# Patient Record
Sex: MALE | Race: WHITE | NOT HISPANIC OR LATINO
[De-identification: names, ages, dates, MRNs, and addresses within clinical notes are randomized per-mention and may not be internally consistent; named-entity substitution may affect disease eponyms.]

---

## 2019-12-05 ENCOUNTER — RECORD ABSTRACTING (OUTPATIENT)
Age: 69
End: 2019-12-05

## 2019-12-05 DIAGNOSIS — Z00.00 ENCOUNTER FOR GENERAL ADULT MEDICAL EXAMINATION W/OUT ABNORMAL FINDINGS: ICD-10-CM

## 2019-12-05 DIAGNOSIS — I10 ESSENTIAL (PRIMARY) HYPERTENSION: ICD-10-CM

## 2019-12-05 DIAGNOSIS — E78.5 HYPERLIPIDEMIA, UNSPECIFIED: ICD-10-CM

## 2019-12-05 DIAGNOSIS — Z78.9 OTHER SPECIFIED HEALTH STATUS: ICD-10-CM

## 2019-12-05 DIAGNOSIS — Z80.1 FAMILY HISTORY OF MALIGNANT NEOPLASM OF TRACHEA, BRONCHUS AND LUNG: ICD-10-CM

## 2019-12-05 LAB — PSA SERPL-MCNC: 6.08

## 2019-12-05 RX ORDER — CANAGLIFLOZIN 300 MG/1
TABLET, FILM COATED ORAL
Refills: 0 | Status: ACTIVE | COMMUNITY

## 2019-12-05 RX ORDER — RAMIPRIL 5 MG/1
CAPSULE ORAL
Refills: 0 | Status: ACTIVE | COMMUNITY

## 2019-12-05 RX ORDER — GLIMEPIRIDE 4 MG/1
TABLET ORAL
Refills: 0 | Status: ACTIVE | COMMUNITY

## 2019-12-05 RX ORDER — METFORMIN HYDROCHLORIDE 625 MG/1
TABLET ORAL
Refills: 0 | Status: ACTIVE | COMMUNITY

## 2019-12-05 RX ORDER — ROSUVASTATIN CALCIUM 5 MG/1
TABLET, FILM COATED ORAL
Refills: 0 | Status: ACTIVE | COMMUNITY

## 2019-12-05 RX ORDER — ASPIRIN 325 MG/1
TABLET, FILM COATED ORAL
Refills: 0 | Status: ACTIVE | COMMUNITY

## 2019-12-05 RX ORDER — FENOFIBRATE 150 MG/1
CAPSULE ORAL
Refills: 0 | Status: ACTIVE | COMMUNITY

## 2020-01-31 ENCOUNTER — APPOINTMENT (OUTPATIENT)
Dept: UROLOGY | Facility: CLINIC | Age: 70
End: 2020-01-31
Payer: MEDICARE

## 2020-01-31 VITALS
TEMPERATURE: 98.6 F | DIASTOLIC BLOOD PRESSURE: 82 MMHG | BODY MASS INDEX: 27.77 KG/M2 | HEIGHT: 72 IN | HEART RATE: 68 BPM | WEIGHT: 205 LBS | SYSTOLIC BLOOD PRESSURE: 116 MMHG

## 2020-01-31 DIAGNOSIS — E11.9 TYPE 2 DIABETES MELLITUS W/OUT COMPLICATIONS: ICD-10-CM

## 2020-01-31 DIAGNOSIS — Z78.9 OTHER SPECIFIED HEALTH STATUS: ICD-10-CM

## 2020-01-31 LAB
BILIRUB UR QL STRIP: NORMAL
CLARITY UR: CLEAR
COLLECTION METHOD: NORMAL
GLUCOSE UR-MCNC: 1000
HCG UR QL: 0.2 EU/DL
HGB UR QL STRIP.AUTO: NORMAL
KETONES UR-MCNC: NORMAL
LEUKOCYTE ESTERASE UR QL STRIP: NORMAL
NITRITE UR QL STRIP: NORMAL
PH UR STRIP: 5
PROT UR STRIP-MCNC: NORMAL
SP GR UR STRIP: 1.02

## 2020-01-31 PROCEDURE — 81003 URINALYSIS AUTO W/O SCOPE: CPT | Mod: QW

## 2020-01-31 PROCEDURE — 52281 CYSTOSCOPY AND TREATMENT: CPT

## 2020-01-31 PROCEDURE — 99215 OFFICE O/P EST HI 40 MIN: CPT | Mod: 25

## 2020-01-31 PROCEDURE — 76857 US EXAM PELVIC LIMITED: CPT

## 2020-01-31 NOTE — ASSESSMENT
[FreeTextEntry1] : I discussed the findings and options with Mr. NINA GILES in detail. I am concerned about the increasing urinary retention.  As the next step we will add Proscar to the Flomax bid.  \par I would like to see Mr. Giles in 6mths for a repeat bladder sono.  \par His next cystoscopic evaluation will be in one year. \par

## 2020-01-31 NOTE — PHYSICAL EXAM
[General Appearance - Well Nourished] : well nourished [General Appearance - Well Developed] : well developed [General Appearance - In No Acute Distress] : no acute distress [Normal Appearance] : normal appearance [Well Groomed] : well groomed [Abdomen Tenderness] : non-tender [Abdomen Soft] : soft [Abdomen Hernia] : no hernia was discovered [Costovertebral Angle Tenderness] : no ~M costovertebral angle tenderness [Abdomen Mass (___ Cm)] : no abdominal mass palpated [Urethral Meatus] : meatus normal [Scrotum] : the scrotum was normal [Urinary Bladder Findings] : the bladder was normal on palpation [Epididymis] : the epididymides were normal [Testes Tenderness] : no tenderness of the testes [Prostate Tenderness] : the prostate was not tender [Testes Mass (___cm)] : there were no testicular masses [No Prostate Nodules] : no prostate nodules [Skin Color & Pigmentation] : normal skin color and pigmentation [Heart Rate And Rhythm] : Heart rate and rhythm were normal [Edema] : no peripheral edema [Respiration, Rhythm And Depth] : normal respiratory rhythm and effort [] : no respiratory distress [Oriented To Time, Place, And Person] : oriented to person, place, and time [Exaggerated Use Of Accessory Muscles For Inspiration] : no accessory muscle use [Mood] : the mood was normal [Affect] : the affect was normal [Not Anxious] : not anxious [Normal Station and Gait] : the gait and station were normal for the patient's age [No Palpable Adenopathy] : no palpable adenopathy [No Focal Deficits] : no focal deficits [FreeTextEntry1] : Mild balanitis suggestive of LSA.

## 2020-01-31 NOTE — LETTER BODY
[Please see my note below.] : Please see my note below. [Dear  ___] : Dear  [unfilled], [Consult Closing:] : Thank you very much for allowing me to participate in the care of this patient.  If you have any questions, please do not hesitate to contact me. [FreeTextEntry3] : Augustine Gates MD, FACS\par

## 2020-01-31 NOTE — HISTORY OF PRESENT ILLNESS
[FreeTextEntry1] : Mr. NINA GILES comes in today for his urologic follow-up.  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 He is here for his semi-annual cystoscopy.  He denies hematuria or any systemic complaints.\par \par From his general urologic history, he reports moderate LUTS (obstructive and irritative) with nocturia x2-4. On occasion he experiences urge incontinence. He is continuing on tamsulosin bid.\par IPSS:  15/35\par Sono:  397cc PVR; 127cc prostate\par \par Mr. Giles has a several year history of ED but is not sexually active and does not want to pursue this.\par \par Path:  TURBT (12/20/16)--TaG3\par \par PSAs: 12/2/19--5.72; 9/15/18--4.23; 5/5/18--5.22; 6/8/17--6.09; 10/20/16--4.58\par \par Renal sono: 12/14/18--Lt complex septated cyst; 1/6/20--Unchanged bilateral renal cysts.\par

## 2020-02-03 LAB — PSA SERPL-MCNC: 5.27 NG/ML

## 2020-02-04 LAB — URINE CYTOLOGY: NORMAL

## 2020-10-23 ENCOUNTER — APPOINTMENT (OUTPATIENT)
Dept: UROLOGY | Facility: CLINIC | Age: 70
End: 2020-10-23

## 2021-03-12 ENCOUNTER — RX RENEWAL (OUTPATIENT)
Age: 71
End: 2021-03-12

## 2021-04-12 ENCOUNTER — APPOINTMENT (OUTPATIENT)
Dept: UROLOGY | Facility: CLINIC | Age: 71
End: 2021-04-12
Payer: MEDICARE

## 2021-04-12 VITALS
OXYGEN SATURATION: 95 % | SYSTOLIC BLOOD PRESSURE: 123 MMHG | HEART RATE: 71 BPM | TEMPERATURE: 98.2 F | DIASTOLIC BLOOD PRESSURE: 66 MMHG | BODY MASS INDEX: 29.12 KG/M2 | WEIGHT: 215 LBS | HEIGHT: 72 IN

## 2021-04-12 LAB
BILIRUB UR QL STRIP: NORMAL
CLARITY UR: CLEAR
COLLECTION METHOD: NORMAL
GLUCOSE UR-MCNC: NORMAL
HCG UR QL: 0.2 EU/DL
HGB UR QL STRIP.AUTO: NORMAL
KETONES UR-MCNC: NORMAL
LEUKOCYTE ESTERASE UR QL STRIP: NORMAL
NITRITE UR QL STRIP: NORMAL
PH UR STRIP: 5
PROT UR STRIP-MCNC: NORMAL
SP GR UR STRIP: 1.01

## 2021-04-12 PROCEDURE — 76857 US EXAM PELVIC LIMITED: CPT | Mod: 59

## 2021-04-12 PROCEDURE — 76775 US EXAM ABDO BACK WALL LIM: CPT

## 2021-04-12 PROCEDURE — 52000 CYSTOURETHROSCOPY: CPT

## 2021-04-12 PROCEDURE — 99215 OFFICE O/P EST HI 40 MIN: CPT | Mod: 25

## 2021-04-12 PROCEDURE — 99072 ADDL SUPL MATRL&STAF TM PHE: CPT

## 2021-04-12 RX ORDER — BLOOD SUGAR DIAGNOSTIC
STRIP MISCELLANEOUS
Qty: 100 | Refills: 0 | Status: ACTIVE | COMMUNITY
Start: 2020-11-02

## 2021-04-12 RX ORDER — ROSUVASTATIN CALCIUM 10 MG/1
10 TABLET, FILM COATED ORAL
Qty: 90 | Refills: 0 | Status: ACTIVE | COMMUNITY
Start: 2021-03-11

## 2021-04-12 RX ORDER — MUPIROCIN 20 MG/G
2 OINTMENT TOPICAL
Qty: 22 | Refills: 0 | Status: ACTIVE | COMMUNITY
Start: 2021-02-16

## 2021-04-12 RX ORDER — BETAMETHASONE DIPROPIONATE 0.5 MG/G
0.05 CREAM TOPICAL
Qty: 45 | Refills: 0 | Status: ACTIVE | COMMUNITY
Start: 2021-02-16

## 2021-04-12 RX ORDER — TAMSULOSIN HYDROCHLORIDE 0.4 MG/1
CAPSULE ORAL
Refills: 0 | Status: DISCONTINUED | COMMUNITY
End: 2021-04-12

## 2021-04-12 NOTE — HISTORY OF PRESENT ILLNESS
[FreeTextEntry1] : Mr. NINA GILES comes in today for his urologic follow-up, including an annual cystoscopy.  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   He denies hematuria or any systemic complaints.\par \par From his general urologic history, he reports moderate LUTS (obstructive and irritative) with nocturia x2-4. On occasion he experiences urge incontinence. He is continuing on tamsulosin bid and finasteride 5mg.\par IPSS:  13/35\par Sono:  45cc PVR; Prostate 115cc\par \par Mr. Giles has a several year history of ED but is not sexually active and does not want to pursue this.\par \par Path:  TURBT (12/20/16)--TaG3\par \par PSAs: 1/31/20--5.27; 12/2/19--5.72; 9/15/18--4.23; 5/5/18--5.22; 6/8/17--6.09; 10/20/16--4.58\par \par Renal sono: 4/12/21--Bilateral complex renal cysts with thin septations; 12/14/18--Lt complex septated cyst; 1/6/20--Unchanged bilateral renal cysts.\par

## 2021-04-12 NOTE — PHYSICAL EXAM
[General Appearance - Well Developed] : well developed [General Appearance - Well Nourished] : well nourished [Normal Appearance] : normal appearance [Well Groomed] : well groomed [General Appearance - In No Acute Distress] : no acute distress [Abdomen Soft] : soft [Abdomen Tenderness] : non-tender [Abdomen Mass (___ Cm)] : no abdominal mass palpated [Abdomen Hernia] : no hernia was discovered [Costovertebral Angle Tenderness] : no ~M costovertebral angle tenderness [Urethral Meatus] : meatus normal [Urinary Bladder Findings] : the bladder was normal on palpation [Scrotum] : the scrotum was normal [Epididymis] : the epididymides were normal [Testes Tenderness] : no tenderness of the testes [Testes Mass (___cm)] : there were no testicular masses [Prostate Tenderness] : the prostate was not tender [No Prostate Nodules] : no prostate nodules [Skin Color & Pigmentation] : normal skin color and pigmentation [Heart Rate And Rhythm] : Heart rate and rhythm were normal [Edema] : no peripheral edema [] : no respiratory distress [Respiration, Rhythm And Depth] : normal respiratory rhythm and effort [Exaggerated Use Of Accessory Muscles For Inspiration] : no accessory muscle use [Oriented To Time, Place, And Person] : oriented to person, place, and time [Affect] : the affect was normal [Mood] : the mood was normal [Not Anxious] : not anxious [Normal Station and Gait] : the gait and station were normal for the patient's age [No Focal Deficits] : no focal deficits [No Palpable Adenopathy] : no palpable adenopathy [FreeTextEntry1] : Bilateral lower extremity varicosities

## 2021-04-12 NOTE — LETTER BODY
[Dear  ___] : Dear  [unfilled], [Please see my note below.] : Please see my note below. [Consult Closing:] : Thank you very much for allowing me to participate in the care of this patient.  If you have any questions, please do not hesitate to contact me. [FreeTextEntry3] : Augustine Gates MD, FACS\par

## 2021-04-12 NOTE — ASSESSMENT
[FreeTextEntry1] : I discussed the findings and options with Mr. NINA GILES in detail. He will continue on the combination therapy for his voiding symptoms as he is doing very well with this regimen.\par \par Regarding the recurrent balanitis, I encouraged him to improve his glucose control.\par \par Mr. Giles will return in one year for his annual interval cystoscopy and renal sonogram (PSA, bladder sono).

## 2021-04-13 ENCOUNTER — NON-APPOINTMENT (OUTPATIENT)
Age: 71
End: 2021-04-13

## 2021-04-13 LAB — PSA SERPL-MCNC: 2.06 NG/ML

## 2021-04-14 LAB — BACTERIA UR CULT: NORMAL

## 2022-04-14 ENCOUNTER — NON-APPOINTMENT (OUTPATIENT)
Age: 72
End: 2022-04-14

## 2022-04-14 ENCOUNTER — APPOINTMENT (OUTPATIENT)
Dept: UROLOGY | Facility: CLINIC | Age: 72
End: 2022-04-14
Payer: MEDICARE

## 2022-04-14 VITALS
RESPIRATION RATE: 18 BRPM | SYSTOLIC BLOOD PRESSURE: 135 MMHG | BODY MASS INDEX: 29.12 KG/M2 | TEMPERATURE: 98.3 F | DIASTOLIC BLOOD PRESSURE: 79 MMHG | OXYGEN SATURATION: 95 % | WEIGHT: 215 LBS | HEIGHT: 72 IN | HEART RATE: 82 BPM

## 2022-04-14 DIAGNOSIS — Z87.898 PERSONAL HISTORY OF OTHER SPECIFIED CONDITIONS: ICD-10-CM

## 2022-04-14 LAB
BILIRUB UR QL STRIP: NEGATIVE
CLARITY UR: CLEAR
COLLECTION METHOD: NORMAL
GLUCOSE UR-MCNC: NORMAL
HCG UR QL: 0.2 EU/DL
HGB UR QL STRIP.AUTO: NEGATIVE
KETONES UR-MCNC: NEGATIVE
LEUKOCYTE ESTERASE UR QL STRIP: NEGATIVE
NITRITE UR QL STRIP: NEGATIVE
PH UR STRIP: 5
PROT UR STRIP-MCNC: NEGATIVE
SP GR UR STRIP: 1

## 2022-04-14 PROCEDURE — 81003 URINALYSIS AUTO W/O SCOPE: CPT | Mod: QW

## 2022-04-14 PROCEDURE — 52000 CYSTOURETHROSCOPY: CPT

## 2022-04-14 PROCEDURE — 76857 US EXAM PELVIC LIMITED: CPT | Mod: 59

## 2022-04-14 PROCEDURE — 76775 US EXAM ABDO BACK WALL LIM: CPT

## 2022-04-14 NOTE — ADDENDUM
[FreeTextEntry1] : A portion of this note was written by [Grady Crandall] on 04/12/2022 acting as a scribe for Dr. Gates. \par \par I have personally reviewed the chart and agree that the record accurately reflects my personal performance of the history, physical exam, assessment, and plan.

## 2022-04-14 NOTE — ASSESSMENT
[FreeTextEntry1] : I discussed the findings and options with . NINA GILES in detail.  He is doing very well urologically and will simply continue on the combination pharmacotherapy for his voiding symptoms.\par \par Providing there are no new problems, I look forward to seeing Mr. Giles in 1 year (PSA, bladder sono, renal sono, cystoscopy). \par \par I have asked him to send us his most recent PSA.

## 2022-04-14 NOTE — HISTORY OF PRESENT ILLNESS
[FreeTextEntry1] : Mr. NINA GILES comes in today for his annual urologic follow-up, including a cystoscopy.  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   He denies hematuria or any systemic complaints. \par \par From his general urologic history, he reports minimal stable lower urinary tract symptoms (obstructive and irritative) with nocturia x 2. On occasion he experiences urge incontinence. He is continuing on tamsulosin bid and finasteride 5mg.\par Sono (performed to assess bladder emptying): 25cc PVR; 131cc prostate\par \par Mr. Giles has a several year history of ED but is not sexually active and does not want to pursue this.\par \par Path:  TURBT (12/20/16)--TaG3\par \par PSAs: 2/8/22--2.15; 4/12/21--2.06; 1/31/20--5.27; 12/2/19--5.72; 9/15/18--4.23; 5/5/18--5.22; 6/8/17--6.09; 10/20/16--4.58\par \par Renal sono: 4/14/21--Bilateral complex renal cysts with thin septations up to 5.6cm (on left); 4/12/21--Bilateral complex renal cysts with thin septations; 12/14/18--Lt complex septated cyst; 1/6/20--Unchanged bilateral renal cysts; \par

## 2022-04-14 NOTE — PHYSICAL EXAM
[General Appearance - Well Developed] : well developed [General Appearance - Well Nourished] : well nourished [Normal Appearance] : normal appearance [Well Groomed] : well groomed [General Appearance - In No Acute Distress] : no acute distress [Abdomen Soft] : soft [Abdomen Tenderness] : non-tender [Abdomen Mass (___ Cm)] : no abdominal mass palpated [Abdomen Hernia] : no hernia was discovered [Costovertebral Angle Tenderness] : no ~M costovertebral angle tenderness [Urethral Meatus] : meatus normal [Urinary Bladder Findings] : the bladder was normal on palpation [Scrotum] : the scrotum was normal [Epididymis] : the epididymides were normal [Testes Tenderness] : no tenderness of the testes [Testes Mass (___cm)] : there were no testicular masses [Skin Color & Pigmentation] : normal skin color and pigmentation [Heart Rate And Rhythm] : Heart rate and rhythm were normal [Edema] : no peripheral edema [] : no respiratory distress [Respiration, Rhythm And Depth] : normal respiratory rhythm and effort [Exaggerated Use Of Accessory Muscles For Inspiration] : no accessory muscle use [Oriented To Time, Place, And Person] : oriented to person, place, and time [Mood] : the mood was normal [Affect] : the affect was normal [Not Anxious] : not anxious [Normal Station and Gait] : the gait and station were normal for the patient's age [No Focal Deficits] : no focal deficits [No Palpable Adenopathy] : no palpable adenopathy [FreeTextEntry1] : Bilateral lower extremity varicosities

## 2022-04-18 LAB — BACTERIA UR CULT: ABNORMAL

## 2022-05-05 ENCOUNTER — APPOINTMENT (OUTPATIENT)
Dept: UROLOGY | Facility: CLINIC | Age: 72
End: 2022-05-05
Payer: MEDICARE

## 2022-05-05 VITALS — TEMPERATURE: 97.8 F

## 2022-05-05 LAB
BILIRUB UR QL STRIP: NORMAL
CLARITY UR: CLEAR
COLLECTION METHOD: NORMAL
GLUCOSE UR-MCNC: NORMAL
HCG UR QL: 0.2 EU/DL
HGB UR QL STRIP.AUTO: NORMAL
KETONES UR-MCNC: NORMAL
LEUKOCYTE ESTERASE UR QL STRIP: NORMAL
NITRITE UR QL STRIP: POSITIVE
PH UR STRIP: 5
PROT UR STRIP-MCNC: NORMAL
SP GR UR STRIP: 1.01

## 2022-05-05 PROCEDURE — 76857 US EXAM PELVIC LIMITED: CPT

## 2022-05-05 PROCEDURE — 99214 OFFICE O/P EST MOD 30 MIN: CPT

## 2022-05-05 PROCEDURE — 81003 URINALYSIS AUTO W/O SCOPE: CPT | Mod: QW

## 2022-05-05 RX ORDER — SULFAMETHOXAZOLE AND TRIMETHOPRIM 800; 160 MG/1; MG/1
800-160 TABLET ORAL TWICE DAILY
Qty: 14 | Refills: 0 | Status: ACTIVE | COMMUNITY
Start: 2022-05-05 | End: 1900-01-01

## 2022-05-05 NOTE — LETTER BODY
[Dear  ___] : Dear  [unfilled], [Consult Closing:] : Thank you very much for allowing me to participate in the care of this patient.  If you have any questions, please do not hesitate to contact me. [Please see my note below.] : Please see my note below. [FreeTextEntry3] : Augustine Gates MD, FACS\par

## 2022-05-05 NOTE — PHYSICAL EXAM
[General Appearance - Well Developed] : well developed [General Appearance - Well Nourished] : well nourished [Normal Appearance] : normal appearance [Well Groomed] : well groomed [General Appearance - In No Acute Distress] : no acute distress [Abdomen Soft] : soft [Abdomen Tenderness] : non-tender [Abdomen Mass (___ Cm)] : no abdominal mass palpated [Abdomen Hernia] : no hernia was discovered [Costovertebral Angle Tenderness] : no ~M costovertebral angle tenderness [Urethral Meatus] : meatus normal [Urinary Bladder Findings] : the bladder was normal on palpation [Scrotum] : the scrotum was normal [Epididymis] : the epididymides were normal [Testes Tenderness] : no tenderness of the testes [Testes Mass (___cm)] : there were no testicular masses [Skin Color & Pigmentation] : normal skin color and pigmentation [Heart Rate And Rhythm] : Heart rate and rhythm were normal [Edema] : no peripheral edema [] : no respiratory distress [Respiration, Rhythm And Depth] : normal respiratory rhythm and effort [Exaggerated Use Of Accessory Muscles For Inspiration] : no accessory muscle use [Oriented To Time, Place, And Person] : oriented to person, place, and time [Affect] : the affect was normal [Mood] : the mood was normal [Not Anxious] : not anxious [No Focal Deficits] : no focal deficits [Normal Station and Gait] : the gait and station were normal for the patient's age [No Palpable Adenopathy] : no palpable adenopathy [Prostate Tenderness] : the prostate was not tender [No Prostate Nodules] : no prostate nodules [FreeTextEntry1] : Bilateral lower extremity varicosities

## 2022-05-05 NOTE — ASSESSMENT
[FreeTextEntry1] : I discussed the findings and options with . NINA GILES in detail.  His acute symptoms are suggestive of a UTI for which I have prescribed Bactrim DS bid, pending the urine culture.  I advised that he monitor his temperature and overall progress, and call us for any worsening.\par \par He will continue on the combination therapy for his chronic voiding symptoms.\par \par Providing the acute symptoms resolve and there are no new problems, I look forward to seeing Mr. Giles in 1 year (PSA, bladder sono, renal sono, cystoscopy). \par \par I have again asked him to send us his most recent PSA.

## 2022-05-05 NOTE — HISTORY OF PRESENT ILLNESS
[FreeTextEntry1] : Mr. NINA GILES comes in today for an urgent urologic follow-up.  For the past three days he has been experiencing increasing urinary frequency with dysuria and nocturia x 5. He denies fever or hematuria.\par \par Mr. Giles has a history of non-muscle invasive bladder cancer identified in Dec 2016 without any recurrences.   He denies systemic complaints. His most recent cystoscopy was in April 2022. \par \par From his general urologic history, he reports minimal stable lower urinary tract symptoms (obstructive and irritative) with nocturia x 2. On occasion he experiences urge incontinence. He is continuing on tamsulosin bid and finasteride 5mg.\par IPSS: 20/35\par Sono (performed to assess bladder emptying): 46cc PVR; 59cc prostate\par \par Mr. Giles has a several year history of ED but is not sexually active and does not want to pursue this.\par \par Path:  TURBT (12/20/16)--TaG3\par \par PSAs: 2/8/22--2.15; 4/12/21--2.06; 1/31/20--5.27; 12/2/19--5.72; 9/15/18--4.23; 5/5/18--5.22; 6/8/17--6.09; 10/20/16--4.58\par \par Renal sono: 4/14/21--Bilateral complex renal cysts with thin septations up to 5.6cm (on left); 4/12/21--Bilateral complex renal cysts with thin septations; 12/14/18--Lt complex septated cyst; 1/6/20--Unchanged bilateral renal cysts; \par

## 2022-05-05 NOTE — ADDENDUM
[FreeTextEntry1] : A portion of this note was written by [Grady Crandall] on 05/04/2022 acting as a scribe for Dr. Gates. \par \par I have personally reviewed the chart and agree that the record accurately reflects my personal performance of the history, physical exam, assessment, and plan.

## 2022-05-09 LAB — BACTERIA UR CULT: ABNORMAL

## 2023-02-24 ENCOUNTER — APPOINTMENT (OUTPATIENT)
Dept: UROLOGY | Facility: CLINIC | Age: 73
End: 2023-02-24

## 2023-03-08 ENCOUNTER — APPOINTMENT (OUTPATIENT)
Dept: ULTRASOUND IMAGING | Facility: CLINIC | Age: 73
End: 2023-03-08

## 2023-04-06 ENCOUNTER — APPOINTMENT (OUTPATIENT)
Dept: UROLOGY | Facility: CLINIC | Age: 73
End: 2023-04-06
Payer: MEDICARE

## 2023-04-06 VITALS
DIASTOLIC BLOOD PRESSURE: 65 MMHG | HEART RATE: 93 BPM | SYSTOLIC BLOOD PRESSURE: 107 MMHG | OXYGEN SATURATION: 95 % | TEMPERATURE: 97.7 F

## 2023-04-06 LAB
BILIRUB UR QL STRIP: NORMAL
CLARITY UR: CLEAR
COLLECTION METHOD: NORMAL
GLUCOSE UR-MCNC: NORMAL
HCG UR QL: 0.2 EU/DL
HGB UR QL STRIP.AUTO: NORMAL
KETONES UR-MCNC: NORMAL
LEUKOCYTE ESTERASE UR QL STRIP: NORMAL
NITRITE UR QL STRIP: NORMAL
PH UR STRIP: 5.5
PROT UR STRIP-MCNC: NORMAL
SP GR UR STRIP: 1.02

## 2023-04-06 PROCEDURE — 52000 CYSTOURETHROSCOPY: CPT

## 2023-04-06 PROCEDURE — 81003 URINALYSIS AUTO W/O SCOPE: CPT | Mod: QW

## 2023-04-06 NOTE — PHYSICAL EXAM
[General Appearance - Well Developed] : well developed [General Appearance - Well Nourished] : well nourished [Normal Appearance] : normal appearance [Well Groomed] : well groomed [General Appearance - In No Acute Distress] : no acute distress [Abdomen Soft] : soft [Abdomen Tenderness] : non-tender [Abdomen Mass (___ Cm)] : no abdominal mass palpated [Abdomen Hernia] : no hernia was discovered [Costovertebral Angle Tenderness] : no ~M costovertebral angle tenderness [Urethral Meatus] : meatus normal [Urinary Bladder Findings] : the bladder was normal on palpation [Scrotum] : the scrotum was normal [Epididymis] : the epididymides were normal [Testes Tenderness] : no tenderness of the testes [Testes Mass (___cm)] : there were no testicular masses [Prostate Tenderness] : the prostate was not tender [No Prostate Nodules] : no prostate nodules [Skin Color & Pigmentation] : normal skin color and pigmentation [Heart Rate And Rhythm] : Heart rate and rhythm were normal [Edema] : no peripheral edema [] : no respiratory distress [Respiration, Rhythm And Depth] : normal respiratory rhythm and effort [Exaggerated Use Of Accessory Muscles For Inspiration] : no accessory muscle use [Oriented To Time, Place, And Person] : oriented to person, place, and time [Affect] : the affect was normal [Mood] : the mood was normal [Not Anxious] : not anxious [Normal Station and Gait] : the gait and station were normal for the patient's age [No Focal Deficits] : no focal deficits [Inguinal Lymph Nodes Enlarged Bilaterally] : inguinal [FreeTextEntry1] : Bilateral lower extremity varicosities

## 2023-04-06 NOTE — ASSESSMENT
[FreeTextEntry1] : I discussed the findings and options with Mr. NINA GILES in detail.  Fortunately, today's cystoscopy was negative.  He is doing well urologically and will simply continue on the combination therapy for his voiding symptoms.\par \par I have asked him to please have the renal sonogram, which was done several weeks ago, sent to us.\par \par Providing that there are no new problems, I look forward to seeing Mr. Giles in 1 year (PSA, bladder sono, ? renal sono, cystoscopy). \par \par

## 2023-04-06 NOTE — HISTORY OF PRESENT ILLNESS
[FreeTextEntry1] : Mr. NINA GILES comes in today for his urologic follow-up, including a scheduled cystoscopy.  Mr. Giles has a history of high grade non-muscle invasive bladder cancer identified in Dec 2016 without any recurrences.   He denies hematuria or systemic complaints. His most recent cystoscopy was in April 2022. \par \par From his general urologic history, Mr. Giles reports minimal stable lower urinary tract symptoms (obstructive and irritative) with nocturia x 2. On occasion he experiences urge incontinence. He is continuing on tamsulosin bid and finasteride 5mg.\par IPSS: n/d\par \par Mr. Giles has a several year history of ED but is not sexually active and does not want to pursue this.\par \par Path:  TURBT (12/20/16)--TaG3\par \par PSAs: 2/8/22--2.15; 4/12/21--2.06; 1/31/20--5.27; 12/2/19--5.72; 9/15/18--4.23; 5/5/18--5.22; 6/8/17--6.09; 10/20/16--4.58\par \par Renal sono: 4/14/21--Bilateral complex renal cysts with thin septations up to 5.6cm (on left); 4/12/21--Bilateral complex renal cysts with thin septations; 12/14/18--Lt complex septated cyst; 1/6/20--Unchanged bilateral renal cysts; \par

## 2023-04-06 NOTE — ADDENDUM
[FreeTextEntry1] : A portion of this note was written by [Grady Crandall] on 04/05/2023 acting as a scribe for Dr. Gates. \par \par I have personally reviewed the chart and agree that the record accurately reflects my personal performance of the history, physical exam, assessment, and plan.

## 2023-04-07 ENCOUNTER — NON-APPOINTMENT (OUTPATIENT)
Age: 73
End: 2023-04-07

## 2023-04-07 LAB — PSA SERPL-MCNC: 1.41 NG/ML

## 2023-04-09 LAB — BACTERIA UR CULT: NORMAL

## 2024-03-11 ENCOUNTER — APPOINTMENT (OUTPATIENT)
Dept: UROLOGY | Facility: CLINIC | Age: 74
End: 2024-03-11
Payer: MEDICARE

## 2024-03-11 DIAGNOSIS — N39.0 URINARY TRACT INFECTION, SITE NOT SPECIFIED: ICD-10-CM

## 2024-03-11 DIAGNOSIS — N47.1 PHIMOSIS: ICD-10-CM

## 2024-03-11 DIAGNOSIS — Z12.5 ENCOUNTER FOR SCREENING FOR MALIGNANT NEOPLASM OF PROSTATE: ICD-10-CM

## 2024-03-11 DIAGNOSIS — N39.41 URGE INCONTINENCE: ICD-10-CM

## 2024-03-11 DIAGNOSIS — N48.1 BALANITIS: ICD-10-CM

## 2024-03-11 DIAGNOSIS — R39.9 UNSPECIFIED SYMPTOMS AND SIGNS INVOLVING THE GENITOURINARY SYSTEM: ICD-10-CM

## 2024-03-11 DIAGNOSIS — C67.9 MALIGNANT NEOPLASM OF BLADDER, UNSPECIFIED: ICD-10-CM

## 2024-03-11 DIAGNOSIS — N28.1 CYST OF KIDNEY, ACQUIRED: ICD-10-CM

## 2024-03-11 PROCEDURE — 76775 US EXAM ABDO BACK WALL LIM: CPT

## 2024-03-11 PROCEDURE — 99215 OFFICE O/P EST HI 40 MIN: CPT | Mod: 25

## 2024-03-11 PROCEDURE — 52000 CYSTOURETHROSCOPY: CPT

## 2024-03-11 PROCEDURE — 76857 US EXAM PELVIC LIMITED: CPT | Mod: 59

## 2024-03-11 RX ORDER — FINASTERIDE 5 MG/1
5 TABLET, FILM COATED ORAL DAILY
Qty: 90 | Refills: 3 | Status: ACTIVE | COMMUNITY
Start: 2020-01-31 | End: 1900-01-01

## 2024-03-11 RX ORDER — TAMSULOSIN HYDROCHLORIDE 0.4 MG/1
0.4 CAPSULE ORAL
Qty: 180 | Refills: 3 | Status: ACTIVE | COMMUNITY
Start: 2020-01-31 | End: 1900-01-01

## 2024-03-11 NOTE — ASSESSMENT
[FreeTextEntry1] : I discussed the findings and options with Mr. NINA GILES in detail and reviewed the renal sonogram.  Fortunately, today's cystoscopy was negative.  He is doing well urologically and will simply continue on the combination therapy for his voiding symptoms.  Providing that there are no new problems and the PSA remains stable, I look forward to seeing Mr. Giles in 1 year (PSA, bladder sono, cystoscopy).

## 2024-03-11 NOTE — HISTORY OF PRESENT ILLNESS
[FreeTextEntry1] : Mr. NINA GILES returns for his annual follow-up, including a scheduled cystoscopy.  Mr. Giles has a history of high grade non-muscle invasive bladder cancer identified in Dec 2016 without any recurrences.   He denies hematuria or systemic complaints. His most recent cystoscopy was in April 2023.   From his general urologic history, Mr. Giles reports minimal stable lower urinary tract symptoms (obstructive and irritative) with nocturia x 2. On occasion he experiences urge incontinence. He is continuing on tamsulosin bid and finasteride 5mg. Sono (performed to assess bladder emptying): 62cc PVR; 162cc prostate  Mr. Giles has a several year history of ED but is not sexually active and does not want to pursue this.  Path: TURBT (12/20/16)--TaG3  PSAs: 4/6/23--1.41; 2/8/22--2.15; 4/12/21--2.06; 1/31/20--5.27; 12/2/19--5.72; 9/15/18--4.23; 5/5/18--5.22; 6/8/17--6.09; 10/20/16--4.58  Renal sono: 3/11/24-- 6 bilateral complex renal cyst with thin septations up to 6 cm on the right I and 5 cm on the left; 4/14/21--Bilateral complex renal cysts with thin septations up to 5.6cm (on left); 4/12/21--Bilateral complex renal cysts with thin septations; 12/14/18--Lt complex septated cyst; 1/6/20--Unchanged bilateral renal cysts;

## 2024-03-11 NOTE — PHYSICAL EXAM
[General Appearance - Well Developed] : well developed [Normal Appearance] : normal appearance [General Appearance - Well Nourished] : well nourished [General Appearance - In No Acute Distress] : no acute distress [Well Groomed] : well groomed [Abdomen Mass (___ Cm)] : no abdominal mass palpated [Abdomen Tenderness] : non-tender [Abdomen Soft] : soft [Costovertebral Angle Tenderness] : no ~M costovertebral angle tenderness [Abdomen Hernia] : no hernia was discovered [Urethral Meatus] : meatus normal [Urinary Bladder Findings] : the bladder was normal on palpation [Scrotum] : the scrotum was normal [Epididymis] : the epididymides were normal [Testes Tenderness] : no tenderness of the testes [Prostate Tenderness] : the prostate was not tender [Testes Mass (___cm)] : there were no testicular masses [No Prostate Nodules] : no prostate nodules [Skin Color & Pigmentation] : normal skin color and pigmentation [Heart Rate And Rhythm] : Heart rate and rhythm were normal [Edema] : no peripheral edema [Respiration, Rhythm And Depth] : normal respiratory rhythm and effort [] : no respiratory distress [Oriented To Time, Place, And Person] : oriented to person, place, and time [Exaggerated Use Of Accessory Muscles For Inspiration] : no accessory muscle use [Affect] : the affect was normal [Mood] : the mood was normal [Not Anxious] : not anxious [Normal Station and Gait] : the gait and station were normal for the patient's age [No Focal Deficits] : no focal deficits [Inguinal Lymph Nodes Enlarged Bilaterally] : inguinal [Penis Abnormality] : normal uncircumcised penis [FreeTextEntry1] : Bilateral lower extremity varicosities

## 2024-03-13 ENCOUNTER — NON-APPOINTMENT (OUTPATIENT)
Age: 74
End: 2024-03-13

## 2024-03-13 LAB
BACTERIA UR CULT: NORMAL
PSA SERPL-MCNC: 1.53 NG/ML

## 2024-09-25 ENCOUNTER — NON-APPOINTMENT (OUTPATIENT)
Age: 74
End: 2024-09-25

## 2025-01-13 ENCOUNTER — APPOINTMENT (OUTPATIENT)
Dept: UROLOGY | Facility: CLINIC | Age: 75
End: 2025-01-13

## 2025-01-27 ENCOUNTER — NON-APPOINTMENT (OUTPATIENT)
Age: 75
End: 2025-01-27

## 2025-01-27 ENCOUNTER — APPOINTMENT (OUTPATIENT)
Dept: UROLOGY | Facility: CLINIC | Age: 75
End: 2025-01-27
Payer: MEDICARE

## 2025-01-27 VITALS
TEMPERATURE: 97.8 F | DIASTOLIC BLOOD PRESSURE: 74 MMHG | HEART RATE: 77 BPM | SYSTOLIC BLOOD PRESSURE: 144 MMHG | OXYGEN SATURATION: 95 %

## 2025-01-27 PROCEDURE — 99214 OFFICE O/P EST MOD 30 MIN: CPT | Mod: 25

## 2025-01-27 PROCEDURE — 52000 CYSTOURETHROSCOPY: CPT

## 2025-05-07 ENCOUNTER — RX RENEWAL (OUTPATIENT)
Age: 75
End: 2025-05-07